# Patient Record
Sex: FEMALE | Race: OTHER | HISPANIC OR LATINO | URBAN - METROPOLITAN AREA
[De-identification: names, ages, dates, MRNs, and addresses within clinical notes are randomized per-mention and may not be internally consistent; named-entity substitution may affect disease eponyms.]

---

## 2023-03-14 PROBLEM — Z00.00 ENCOUNTER FOR PREVENTIVE HEALTH EXAMINATION: Status: ACTIVE | Noted: 2023-03-14

## 2023-03-20 ENCOUNTER — OUTPATIENT (OUTPATIENT)
Dept: OUTPATIENT SERVICES | Facility: HOSPITAL | Age: 23
LOS: 1 days | End: 2023-03-20
Payer: COMMERCIAL

## 2023-03-20 ENCOUNTER — APPOINTMENT (OUTPATIENT)
Dept: PEDIATRIC ADOLESCENT MEDICINE | Facility: CLINIC | Age: 23
End: 2023-03-20
Payer: COMMERCIAL

## 2023-03-20 ENCOUNTER — NON-APPOINTMENT (OUTPATIENT)
Age: 23
End: 2023-03-20

## 2023-03-20 VITALS
RESPIRATION RATE: 20 BRPM | HEIGHT: 58.5 IN | HEART RATE: 78 BPM | TEMPERATURE: 97 F | SYSTOLIC BLOOD PRESSURE: 108 MMHG | BODY MASS INDEX: 24.72 KG/M2 | WEIGHT: 120.99 LBS | DIASTOLIC BLOOD PRESSURE: 57 MMHG

## 2023-03-20 DIAGNOSIS — Z01.419 ENCOUNTER FOR GYNECOLOGICAL EXAMINATION (GENERAL) (ROUTINE) W/OUT ABNORMAL FINDINGS: ICD-10-CM

## 2023-03-20 DIAGNOSIS — Z00.00 ENCOUNTER FOR GENERAL ADULT MEDICAL EXAMINATION WITHOUT ABNORMAL FINDINGS: ICD-10-CM

## 2023-03-20 DIAGNOSIS — N89.8 OTHER SPECIFIED NONINFLAMMATORY DISORDERS OF VAGINA: ICD-10-CM

## 2023-03-20 PROCEDURE — 99215 OFFICE O/P EST HI 40 MIN: CPT | Mod: 25

## 2023-03-20 PROCEDURE — T1013: CPT

## 2023-03-20 PROCEDURE — 88142 CYTOPATH C/V THIN LAYER: CPT

## 2023-03-20 RX ORDER — METRONIDAZOLE 500 MG/1
500 TABLET ORAL TWICE DAILY
Refills: 0 | Status: COMPLETED | OUTPATIENT
Start: 2023-03-20

## 2023-03-20 RX ADMIN — METRONIDAZOLE 0 MG: 500 TABLET ORAL at 00:00

## 2023-03-21 ENCOUNTER — NON-APPOINTMENT (OUTPATIENT)
Age: 23
End: 2023-03-21

## 2023-03-21 NOTE — HISTORY OF PRESENT ILLNESS
[Up to date] : Up to date [LMP: _____] : LMP: [unfilled] [Days of Bleeding: _____] : Days of bleeding: [unfilled] [Has friends] : has friends [No] : No cigarette smoke exposure [Yes] : Patient has had sexual intercourse. [Has ways to cope with stress] : has ways to cope with stress [Displays self-confidence] : displays self-confidence [With Teen] : teen [Normal] : normal [Eats regular meals including adequate fruits and vegetables] : eats regular meals including adequate fruits and vegetables [Has peer relationships free of violence] : has peer relationships free of violence [Irregular menses] : no irregular menses [Heavy Bleeding] : no heavy bleeding [Hirsutism] : no hirsutism [Acne] : no acne [Has interests/participates in community activities/volunteers] : does not have interests/participates in community activities/volunteers [Uses electronic nicotine delivery system] : does not use electronic nicotine delivery system [Exposure to electronic nicotine delivery system] : no exposure to electronic nicotine delivery system [Uses tobacco] : does not use tobacco [Exposure to tobacco] : no exposure to tobacco [Uses drugs] : does not use drugs  [Exposure to drugs] : no exposure to drugs [Drinks alcohol] : does not drink alcohol [Exposure to alcohol] : no exposure to alcohol [Has problems with sleep] : does not have problems with sleep [Has thought about hurting self or considered suicide] : has not thought about hurting self or considered suicide [de-identified] : As per patient [de-identified] : Does not attend school  [de-identified] : Last encounter was 3 months ago, not currently sexually active [FreeTextEntry1] : 21 yo  female presenting to the clinic for gynecological exam and establishment of care. Presenting with 3 months of foul vaginal smell, and white discharge. Denies any dysuria, hematuria, abdominal pain, fever. pt has taken metronidazole for this condition in the past.\par Positive family hx of cervical and vulvar cancer in mother and grandmother respectively, for which she has been getting PAP smears every 6 months, as per doctor recommendations in [place], last PAP smear was about a year ago and was normal. Patient denies any cervical changes. \par Patient moved from [place] in 2023, [place] border, was initially living in [place], moved to live in [place] with her friend due to unsafe living condition. Patient feels safe with friend. 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 Patient has a 4 year old daughter who lives in [place] with her mother, plans on bringing her to [place] to live with her once she establishes a more stable living. No known allergies, denies medical history, surgical hx, does not take any medications regularly. \par

## 2023-03-21 NOTE — BEGINNING OF VISIT
[Patient] : patient [] :  [Other: ______] : provided by [employer] [Time Spent: ____ minutes] : Total time spent using  services: [unfilled] minutes. The patient's primary language is not English thus required  services. [Interpreters_IDNumber] : [id number]

## 2023-03-21 NOTE — PHYSICAL EXAM
[No Acute Distress] : no acute distress [Normocephalic] : normocephalic [Atraumatic] : atraumatic [EOMI Bilateral] : EOMI bilateral [Clear tympanic membranes with bony landmarks and light reflex present bilaterally] : clear tympanic membranes with bony landmarks and light reflex present bilaterally  [Pink Nasal Mucosa] : pink nasal mucosa [Nonerythematous Oropharynx] : nonerythematous oropharynx [Clear to Auscultation Bilaterally] : clear to auscultation bilaterally [Regular Rate and Rhythm] : regular rate and rhythm [Normal S1, S2 audible] : normal S1, S2 audible [No Murmurs] : no murmurs [Soft] : soft [NonTender] : non tender [Non Distended] : non distended [Normoactive Bowel Sounds] : normoactive bowel sounds [Josias: ____] : Josias [unfilled] [No Masses] : no masses [Josias: _____] : Josias [unfilled] [Normal Muscle Tone] : normal muscle tone [Straight] : straight [No Nipple Discharge] : no nipple discharge [Normal External Genitalia] : normal external genitalia [de-identified] : KHALIDA Narayan, PGY2 was the chaperone for the breast exam [FreeTextEntry6] : KHALIDA Narayan, PGY2 and FAROOQ Davila were the chaperones for GYN exam

## 2023-03-21 NOTE — RISK ASSESSMENT
[0] : 2) Feeling down, depressed, or hopeless: Not at all (0) [PHQ-2 Negative - No further assessment needed] : PHQ-2 Negative - No further assessment needed [[id number]] : 0

## 2023-03-21 NOTE — DISCUSSION/SUMMARY
[Met privately with the adolescent for part of the office visit?] : Met privately with the adolescent for part of the office visit? Yes [Adolescent demonstrates understanding of his/her conditions and how to take prescribed medications?] : Adolescent demonstrates understanding of his/her conditions and how to take prescribed medications? Yes [Adolescent asks questions during each office  visit and participates in the care plan?] : Adolescent asks questions during each office visit and participates in the care plan? Yes [Adolescent is competent in independently making appointments, filling prescriptions, following up on referrals, and seeking emergency services, as needed?] : Adolescent is competent in independently making appointments, filling prescriptions, following up on referrals, and seeking emergency services, as needed? Yes [FreeTextEntry1] : contraception reviewed, including LARC and abstinence. pt continues to be abstinent\par reviewed STI and HIV. The following key points were reviewed with the patient:\par \par ·	HIV is the virus that causes AIDS.  It can be spread through unprotected sex (vaginal, anal or oral sex) with someone who has HIV; contact with HIV-infected blood by sharing needles (piercing, tattooing, drug equipment, including needles); by HIV-infected pregnant women to their infants during pregnancy or delivery, or by breast-feeding.\par ·	There are treatments for HIV/AIDS that can help a person stay healthy.\par ·	People with HIV/AIDS can use safe practices to protect others from becoming infected.  Safe practices also protect people with HIV/AIDS from being infected with different strains of HIV.\par ·	Testing is voluntary and can be done at a public testing center without giving your name  (anonymous testing).\par ·	By law, HIV test results and other related information are kept confidential (private).\par ·	Discrimination based on a person’s HIV status is illegal. People who are discriminated against can get help.\par ·	Consent for HIV-related testing remains in effect until it is withdrawn verbally or in writing.  If the consent was given for a specific period of time, the consent applies to that time period only.  Persons may withdraw their consent at any time.\par \par [ X] Verbal discussion occurred with patient\par [ ] Written information was given to patient\par \par HIV testing was offered and the patient agreed to HIV testing.\par \par \par pap, vaginal panel and STI/HIV sent\par treatment with Metronidazole\par f/u 2 weeks

## 2023-03-21 NOTE — REVIEW OF SYSTEMS
[Vaginal Dischage] : vaginal discharge [Vaginal Itch] : vaginal itch [Fever] : no fever [Change in Weight] : no change in weight [Headache] : no headache [Dysuria] : no dysuria [Polyuria] : no polyuria [Hematuria] : no hematuria [Vaginal Pain] : no vaginal pain

## 2023-03-24 LAB — CYTOLOGY CVX/VAG DOC THIN PREP: ABNORMAL

## 2023-03-27 LAB
A VAGINAE DNA VAG QL NAA+PROBE: ABNORMAL
BVAB2 DNA VAG QL NAA+PROBE: NORMAL
C KRUSEI DNA VAG QL NAA+PROBE: NEGATIVE
C KRUSEI DNA VAG QL NAA+PROBE: POSITIVE
C TRACH RRNA SPEC QL NAA+PROBE: NEGATIVE
MEGA1 DNA VAG QL NAA+PROBE: NORMAL
N GONORRHOEA RRNA SPEC QL NAA+PROBE: NEGATIVE
T VAGINALIS RRNA SPEC QL NAA+PROBE: NEGATIVE

## 2023-03-28 DIAGNOSIS — Z01.419 ENCOUNTER FOR GYNECOLOGICAL EXAMINATION (GENERAL) (ROUTINE) WITHOUT ABNORMAL FINDINGS: ICD-10-CM

## 2023-03-28 DIAGNOSIS — N89.8 OTHER SPECIFIED NONINFLAMMATORY DISORDERS OF VAGINA: ICD-10-CM

## 2023-03-28 DIAGNOSIS — Z30.09 ENCOUNTER FOR OTHER GENERAL COUNSELING AND ADVICE ON CONTRACEPTION: ICD-10-CM

## 2023-04-01 ENCOUNTER — LABORATORY RESULT (OUTPATIENT)
Age: 23
End: 2023-04-01

## 2023-04-02 LAB
HIV1+2 AB SPEC QL IA.RAPID: NONREACTIVE
T PALLIDUM AB SER QL IA: POSITIVE

## 2023-04-03 ENCOUNTER — APPOINTMENT (OUTPATIENT)
Dept: PEDIATRIC ADOLESCENT MEDICINE | Facility: CLINIC | Age: 23
End: 2023-04-03
Payer: COMMERCIAL

## 2023-04-03 ENCOUNTER — OUTPATIENT (OUTPATIENT)
Dept: OUTPATIENT SERVICES | Facility: HOSPITAL | Age: 23
LOS: 1 days | End: 2023-04-03
Payer: COMMERCIAL

## 2023-04-03 ENCOUNTER — NON-APPOINTMENT (OUTPATIENT)
Age: 23
End: 2023-04-03

## 2023-04-03 VITALS
SYSTOLIC BLOOD PRESSURE: 113 MMHG | RESPIRATION RATE: 20 BRPM | DIASTOLIC BLOOD PRESSURE: 57 MMHG | TEMPERATURE: 97.5 F | HEART RATE: 77 BPM | HEIGHT: 58.5 IN | WEIGHT: 123 LBS | BODY MASS INDEX: 25.13 KG/M2

## 2023-04-03 DIAGNOSIS — Z23 ENCOUNTER FOR IMMUNIZATION: ICD-10-CM

## 2023-04-03 DIAGNOSIS — Z00.00 ENCOUNTER FOR GENERAL ADULT MEDICAL EXAMINATION WITHOUT ABNORMAL FINDINGS: ICD-10-CM

## 2023-04-03 PROCEDURE — 99215 OFFICE O/P EST HI 40 MIN: CPT | Mod: 25

## 2023-04-03 PROCEDURE — 90471 IMMUNIZATION ADMIN: CPT

## 2023-04-03 PROCEDURE — 90651 9VHPV VACCINE 2/3 DOSE IM: CPT

## 2023-04-03 RX ORDER — PENICILLIN G BENZATHINE 2400000 [IU]/4ML
2400000 INJECTION, SUSPENSION INTRAMUSCULAR
Qty: 0 | Refills: 0 | Status: COMPLETED | OUTPATIENT
Start: 2023-04-03

## 2023-04-03 RX ADMIN — PENICILLIN G BENZATHINE 0 UNIT/4ML: 2400000 INJECTION, SUSPENSION INTRAMUSCULAR at 00:00

## 2023-04-05 NOTE — BEGINNING OF VISIT
[Patient] : patient [] :  [Other: ______] : provided by [employer] [Time Spent: ____ minutes] : Total time spent using  services: [unfilled] minutes. The patient's primary language is not English thus required  services. [Interpreters_IDNumber] : [id number] [Interpreters_FullName] : Ayan [TWNoteComboBox1] : [place]

## 2023-04-05 NOTE — DISCUSSION/SUMMARY
[FreeTextEntry1] : 23 yo F presented to the clinic for follow up results. Patient had negative HIV, gonorrhea and pap test. She was positive for Syphilis, never treated but asymptomatic. \par \par Plan:\par 1. Syphilis -Latent\par \par - Anticipatory guidance given\par - Penicillin G given in 1 dose at visit - tolerated well. no complications\par - Results to be reported to health department\par - pt to inform partner in [place] of diagnosis and treatment\par - HPV 2nd vaccine given at visit\par - Follow up in 4 mths for Repeat. syphilis test and 3rd HPV vaccine\par - pt states her partner will be in [place] in 3 months. interested in birth control when he arrives.  recommended to return to health center for contraceptive counseling in 1-2 month to review choices and start.

## 2023-04-05 NOTE — HISTORY OF PRESENT ILLNESS
[de-identified] : contraception and STI [FreeTextEntry6] : 23 yo F with Hx of HPV infection presented to the clinic for results follow up. Patient had negative HIV, GC and pap test. However, her Syphilis was positive, patient mentioned that when she was pregnant 4 yrs ago she had a positive test and then Repeat test was negative, so she was never treated. She had a repeat RPR in Oct 2022 that was negative. 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 Patient denied Sxs.\par \par PMH: HPV + 5yrs ago, got treatment every 6 mths until negative\par Med: none\par Allergy: NKDA\par Gyn: regular periods, last 5 days about 4 pads/day\par FH: Lives with a friend\par received 1 dose of HPV at 11 yr old\par \par

## 2023-04-05 NOTE — REVIEW OF SYSTEMS
[Fever] : no fever [Chills] : no chills [Headache] : no headache [Eye Discharge] : no eye discharge [Eye Redness] : no eye redness [Chest Pain] : no chest pain [Cough] : no cough [Vomiting] : no vomiting [Diarrhea] : no diarrhea [Abdominal Pain] : no abdominal pain [Weakness] : no weakness [Dizziness] : no dizziness [Dysuria] : no dysuria [Vaginal Dischage] : no vaginal discharge [Vaginal Itch] : no vaginal itch [Irregular Menstrual Cycle] : no irregular menstrual cycle

## 2023-04-05 NOTE — PHYSICAL EXAM
[Alert] : alert [Clear] : right tympanic membrane clear [Symmetric Chest Wall] : symmetric chest wall [Clear to Auscultation Bilaterally] : clear to auscultation bilaterally [Regular Rate and Rhythm] : regular rate and rhythm [Normal S1, S2 audible] : normal S1, S2 audible [Soft] : soft [Normal Bowel Sounds] : normal bowel sounds [Acute Distress] : no acute distress [Tired appearing] : not tired appearing [Erythematous Oropharynx] : nonerythematous oropharynx [Supple] : not supple [Tenderness With Palpation of Chest Wall] : no tenderness with palpation of chest wall [Tender] : nontender [Distended] : nondistended [NL] : warm, clear

## 2023-04-06 DIAGNOSIS — A53.0 LATENT SYPHILIS, UNSPECIFIED AS EARLY OR LATE: ICD-10-CM

## 2023-04-06 DIAGNOSIS — Z23 ENCOUNTER FOR IMMUNIZATION: ICD-10-CM

## 2023-04-06 DIAGNOSIS — Z30.09 ENCOUNTER FOR OTHER GENERAL COUNSELING AND ADVICE ON CONTRACEPTION: ICD-10-CM

## 2023-04-10 ENCOUNTER — OUTPATIENT (OUTPATIENT)
Dept: OUTPATIENT SERVICES | Facility: HOSPITAL | Age: 23
LOS: 1 days | End: 2023-04-10
Payer: COMMERCIAL

## 2023-04-10 ENCOUNTER — APPOINTMENT (OUTPATIENT)
Dept: PEDIATRIC ADOLESCENT MEDICINE | Facility: CLINIC | Age: 23
End: 2023-04-10
Payer: COMMERCIAL

## 2023-04-10 VITALS
HEIGHT: 58 IN | WEIGHT: 120 LBS | BODY MASS INDEX: 25.19 KG/M2 | TEMPERATURE: 96.3 F | SYSTOLIC BLOOD PRESSURE: 110 MMHG | RESPIRATION RATE: 16 BRPM | HEART RATE: 75 BPM | DIASTOLIC BLOOD PRESSURE: 59 MMHG

## 2023-04-10 DIAGNOSIS — Z00.00 ENCOUNTER FOR GENERAL ADULT MEDICAL EXAMINATION WITHOUT ABNORMAL FINDINGS: ICD-10-CM

## 2023-04-10 DIAGNOSIS — B37.31 ACUTE CANDIDIASIS OF VULVA AND VAGINA: ICD-10-CM

## 2023-04-10 PROCEDURE — 99214 OFFICE O/P EST MOD 30 MIN: CPT | Mod: 25

## 2023-04-10 PROCEDURE — T1013: CPT

## 2023-04-10 RX ADMIN — Medication 0 UNIT/4ML: at 00:00

## 2023-04-11 NOTE — HISTORY OF PRESENT ILLNESS
[de-identified] : syphilis treatment [FreeTextEntry6] : pt is a 22 year old  female with latent syphilis with positive confirmed test result on 4/1/23. pt received 1 penicillin treatment 1 week ago, on 4/3/23. pt reported that she had a positive test 4 years ago and was not treated at the time but subsequent negative tests. last negative test was October 2022 (6 months ago)\par [facility] contacted pt to recommend a total of 3 treatments.  pt scheduled appointment for second treatment today.  states that she had no complications from previous treatment.  NKDA\par pt states that her ex-partner, who lives in [place], is getting treatment now.  she states that he is no longer her partner.  \par

## 2023-04-11 NOTE — BEGINNING OF VISIT
[Patient] : patient [] :  [Other: ______] : provided by [employer] [Time Spent: ____ minutes] : Total time spent using  services: [unfilled] minutes. The patient's primary language is not English thus required  services. [Interpreters_IDNumber] : [id number] [Interpreters_FullName] : Lorenza

## 2023-04-11 NOTE — RISK ASSESSMENT
[Has family members/adults to turn to for help] : has family members/adults to turn to for help [Has concerns about body or appearance] : does not have concerns about body or appearance [Has friends] : has friends [Uses tobacco] : does not use tobacco [Home is free of violence] : home is free of violence [Has peer relationships free of violence] : has peer relationships free of violence [Has had sexual intercourse] : has had sexual intercourse [Displays self-confidence] : displays self-confidence [de-identified] : employed

## 2023-04-11 NOTE — DISCUSSION/SUMMARY
[FreeTextEntry1] : pt asked me to contact Aleida Rodgers of [facility] [phone]. recommended 3 doses of peniciliin. left message.  Aleida Rodgers contacted our office nurse who confirmed second dose of penicillin was given today as treatment.  STI report with first dose of penicillin treatment was submitted to [facility] reportable cases.\par reviewed PAP results which showed negative for malignant changes but positive for Candida. reviewed OTC treatment options for patient. pt verbalized understanding of the plan.\par pt to return in 1 week for 3rd penicillin treatment\par condom use reviewed.

## 2023-04-12 DIAGNOSIS — A53.0 LATENT SYPHILIS, UNSPECIFIED AS EARLY OR LATE: ICD-10-CM

## 2023-04-12 DIAGNOSIS — B37.31 ACUTE CANDIDIASIS OF VULVA AND VAGINA: ICD-10-CM

## 2023-04-12 DIAGNOSIS — Z30.09 ENCOUNTER FOR OTHER GENERAL COUNSELING AND ADVICE ON CONTRACEPTION: ICD-10-CM

## 2023-04-17 ENCOUNTER — APPOINTMENT (OUTPATIENT)
Dept: PEDIATRIC ADOLESCENT MEDICINE | Facility: CLINIC | Age: 23
End: 2023-04-17

## 2023-04-17 ENCOUNTER — OUTPATIENT (OUTPATIENT)
Dept: OUTPATIENT SERVICES | Facility: HOSPITAL | Age: 23
LOS: 1 days | End: 2023-04-17
Payer: COMMERCIAL

## 2023-04-17 ENCOUNTER — APPOINTMENT (OUTPATIENT)
Dept: PEDIATRIC ADOLESCENT MEDICINE | Facility: CLINIC | Age: 23
End: 2023-04-17
Payer: COMMERCIAL

## 2023-04-17 VITALS
SYSTOLIC BLOOD PRESSURE: 127 MMHG | RESPIRATION RATE: 20 BRPM | TEMPERATURE: 96.5 F | HEART RATE: 83 BPM | DIASTOLIC BLOOD PRESSURE: 56 MMHG | HEIGHT: 58 IN | BODY MASS INDEX: 25.19 KG/M2 | WEIGHT: 120 LBS

## 2023-04-17 DIAGNOSIS — A53.0 LATENT SYPHILIS, UNSPECIFIED AS EARLY OR LATE: ICD-10-CM

## 2023-04-17 DIAGNOSIS — Z00.00 ENCOUNTER FOR GENERAL ADULT MEDICAL EXAMINATION WITHOUT ABNORMAL FINDINGS: ICD-10-CM

## 2023-04-17 DIAGNOSIS — B35.1 TINEA UNGUIUM: ICD-10-CM

## 2023-04-17 PROCEDURE — 99214 OFFICE O/P EST MOD 30 MIN: CPT | Mod: 25

## 2023-04-17 PROCEDURE — T1013: CPT

## 2023-04-17 PROCEDURE — 99214 OFFICE O/P EST MOD 30 MIN: CPT

## 2023-04-17 RX ORDER — TERBINAFINE HYDROCHLORIDE 250 MG/1
250 TABLET ORAL DAILY
Qty: 84 | Refills: 0 | Status: ACTIVE | COMMUNITY
Start: 2023-04-17 | End: 1900-01-01

## 2023-04-17 RX ADMIN — PENICILLIN G BENZATHINE 0 UNIT/4ML: 2400000 INJECTION, SUSPENSION INTRAMUSCULAR at 00:00

## 2023-04-18 NOTE — DISCUSSION/SUMMARY
[FreeTextEntry1] : received 3rd injection of penicillin. no complications, tolerated well\par reviewed consistent condom use.  reviewed contraception, including LARC and abstinence.  pt interested in starting DepoProvera.  will review at next visit and initiate contraception\par will prescribe treatment for toenail fungal infection.  pt awaiting health insurance prescription card in the mail\par f/u in 1 week to review contraception\par f/u 3 months to repeat syphilis testing\par

## 2023-04-18 NOTE — PHYSICAL EXAM
[NL] : regular rate and rhythm, normal S1, S2 audible, no murmurs [de-identified] : 3 thickened toenails on L foot  - exam limited by nail polish

## 2023-04-18 NOTE — RISK ASSESSMENT
[Has family members/adults to turn to for help] : has family members/adults to turn to for help [Uses tobacco] : does not use tobacco [Home is free of violence] : home is free of violence [Has peer relationships free of violence] : has peer relationships free of violence [Has had sexual intercourse] : has had sexual intercourse [Displays self-confidence] : displays self-confidence

## 2023-04-18 NOTE — HISTORY OF PRESENT ILLNESS
[de-identified] : Latent syphilis  [FreeTextEntry6] : Pt is a 22 year old  female with latent syphilis with positive confirmed test result on 4/1/23. pt received 2 penicillin treatments, on 4/3/23 and 4/10/23. Pt reported that she had a positive test 4 years ago and was not treated at the time but subsequent negative tests. last negative test was October 2022 (6 months ago)\par [facility] contacted pt to recommend a total of 3 treatments. pt scheduled appointment for third treatment today. states that she had no complications from previous treatment. NKDA\par pt states that her ex-partner, who lives in [place], is getting treatment now. she states that he is no longer her partner. \par \par At last appointment patient reported vaginal pruritus, for which she used OTC Monistat cream for 7 days, after which her symptoms resolved.\par \par Patient also reports chronic fungal infection of toenails on L foot. She was taking an oral antifungal medication in [place], which she completed 1 month of 6 month course. Medication was discontinued in December 2023 when [place] arrived in the [place] She has no pain, pruritus, wound, or discharge from the toes.

## 2023-04-18 NOTE — BEGINNING OF VISIT
[Patient] : patient [] :  [Other: ______] : provided by [employer] [Time Spent: ____ minutes] : Total time spent using  services: [unfilled] minutes. The patient's primary language is not English thus required  services. [Interpreters_IDNumber] : [id number] [Interpreters_FullName] : Mariajose

## 2023-04-20 DIAGNOSIS — A53.0 LATENT SYPHILIS, UNSPECIFIED AS EARLY OR LATE: ICD-10-CM

## 2023-04-20 DIAGNOSIS — Z30.09 ENCOUNTER FOR OTHER GENERAL COUNSELING AND ADVICE ON CONTRACEPTION: ICD-10-CM

## 2023-04-20 DIAGNOSIS — B35.1 TINEA UNGUIUM: ICD-10-CM

## 2023-04-24 ENCOUNTER — APPOINTMENT (OUTPATIENT)
Dept: PEDIATRIC ADOLESCENT MEDICINE | Facility: CLINIC | Age: 23
End: 2023-04-24

## 2023-05-09 ENCOUNTER — APPOINTMENT (OUTPATIENT)
Dept: PEDIATRIC ADOLESCENT MEDICINE | Facility: CLINIC | Age: 23
End: 2023-05-09

## 2023-05-12 ENCOUNTER — APPOINTMENT (OUTPATIENT)
Dept: PEDIATRIC ADOLESCENT MEDICINE | Facility: CLINIC | Age: 23
End: 2023-05-12

## 2023-05-23 ENCOUNTER — OUTPATIENT (OUTPATIENT)
Dept: OUTPATIENT SERVICES | Facility: HOSPITAL | Age: 23
LOS: 1 days | End: 2023-05-23
Payer: COMMERCIAL

## 2023-05-23 ENCOUNTER — NON-APPOINTMENT (OUTPATIENT)
Age: 23
End: 2023-05-23

## 2023-05-23 ENCOUNTER — RESULT CHARGE (OUTPATIENT)
Age: 23
End: 2023-05-23

## 2023-05-23 ENCOUNTER — APPOINTMENT (OUTPATIENT)
Dept: PEDIATRIC ADOLESCENT MEDICINE | Facility: CLINIC | Age: 23
End: 2023-05-23
Payer: COMMERCIAL

## 2023-05-23 VITALS
TEMPERATURE: 97 F | WEIGHT: 124 LBS | HEART RATE: 76 BPM | BODY MASS INDEX: 26.03 KG/M2 | RESPIRATION RATE: 20 BRPM | DIASTOLIC BLOOD PRESSURE: 51 MMHG | HEIGHT: 58 IN | SYSTOLIC BLOOD PRESSURE: 114 MMHG

## 2023-05-23 DIAGNOSIS — Z00.00 ENCOUNTER FOR GENERAL ADULT MEDICAL EXAMINATION WITHOUT ABNORMAL FINDINGS: ICD-10-CM

## 2023-05-23 DIAGNOSIS — Z30.013 ENCOUNTER FOR INITIAL PRESCRIPTION OF INJECTABLE CONTRACEPTIVE: ICD-10-CM

## 2023-05-23 PROCEDURE — 81025 URINE PREGNANCY TEST: CPT

## 2023-05-23 PROCEDURE — 99215 OFFICE O/P EST HI 40 MIN: CPT | Mod: 25

## 2023-05-23 PROCEDURE — 96372 THER/PROPH/DIAG INJ SC/IM: CPT

## 2023-05-23 RX ORDER — MEDROXYPROGESTERONE ACETATE 150 MG/ML
150 INJECTION, SUSPENSION INTRAMUSCULAR
Refills: 0 | Status: COMPLETED | OUTPATIENT
Start: 2023-05-23

## 2023-05-23 RX ADMIN — MEDROXYPROGESTERONE ACETATE 0 MG/ML: 150 INJECTION, SUSPENSION, EXTENDED RELEASE INTRAMUSCULAR at 00:00

## 2023-05-23 NOTE — DISCUSSION/SUMMARY
[FreeTextEntry1] : 23 yo F  is here for follow up on syphilis and to be started on depo shot. Currently she is asymptomatic. VSS. PE unremarkable. \par - POC preganancy test \par - depo shot today \par - RTC in July for re-testing for syphilis \par - RTC for depo shot \par - RTC prn

## 2023-05-23 NOTE — HISTORY OF PRESENT ILLNESS
[FreeTextEntry6] : 23 yo F  is here for follow up on syphilis and to be started on depo shot. Patient had depo shot before. She had last shot 7-8 months ago. [place] moved [place] and had no access to depo shot. \par LMP 24, menarche 14 years old, every 30 days. \par Last sexual intercourse 3 months ago. \par Patient received 3 shot of penicillin, last given 3 weeks ago. \par Patient denied nausea, vomiting, diarrhea, constipation, dysuria, hematuria, vaginal discharge. \par \par U preg negative.  Depo given/started.

## 2023-05-24 LAB — HCG UR QL: NEGATIVE

## 2023-05-25 DIAGNOSIS — Z32.02 ENCOUNTER FOR PREGNANCY TEST, RESULT NEGATIVE: ICD-10-CM

## 2023-05-25 DIAGNOSIS — Z30.013 ENCOUNTER FOR INITIAL PRESCRIPTION OF INJECTABLE CONTRACEPTIVE: ICD-10-CM

## 2023-07-24 ENCOUNTER — LABORATORY RESULT (OUTPATIENT)
Age: 23
End: 2023-07-24

## 2023-07-25 ENCOUNTER — APPOINTMENT (OUTPATIENT)
Dept: PEDIATRIC ADOLESCENT MEDICINE | Facility: CLINIC | Age: 23
End: 2023-07-25
Payer: COMMERCIAL

## 2023-07-25 ENCOUNTER — OUTPATIENT (OUTPATIENT)
Dept: OUTPATIENT SERVICES | Facility: HOSPITAL | Age: 23
LOS: 1 days | End: 2023-07-25
Payer: COMMERCIAL

## 2023-07-25 VITALS
TEMPERATURE: 97.1 F | HEIGHT: 58 IN | SYSTOLIC BLOOD PRESSURE: 111 MMHG | BODY MASS INDEX: 25.61 KG/M2 | RESPIRATION RATE: 20 BRPM | WEIGHT: 122 LBS | HEART RATE: 72 BPM | DIASTOLIC BLOOD PRESSURE: 62 MMHG

## 2023-07-25 DIAGNOSIS — J02.9 ACUTE PHARYNGITIS, UNSPECIFIED: ICD-10-CM

## 2023-07-25 DIAGNOSIS — Z00.00 ENCOUNTER FOR GENERAL ADULT MEDICAL EXAMINATION WITHOUT ABNORMAL FINDINGS: ICD-10-CM

## 2023-07-25 PROCEDURE — T1013: CPT

## 2023-07-25 PROCEDURE — 36415 COLL VENOUS BLD VENIPUNCTURE: CPT

## 2023-07-25 PROCEDURE — 99215 OFFICE O/P EST HI 40 MIN: CPT | Mod: 25

## 2023-07-25 PROCEDURE — 86780 TREPONEMA PALLIDUM: CPT

## 2023-07-25 PROCEDURE — 86592 SYPHILIS TEST NON-TREP QUAL: CPT

## 2023-07-25 PROCEDURE — 87081 CULTURE SCREEN ONLY: CPT

## 2023-07-25 PROCEDURE — 86593 SYPHILIS TEST NON-TREP QUANT: CPT

## 2023-07-25 NOTE — DISCUSSION/SUMMARY
[FreeTextEntry1] : 22Y F PMH of syphilis presenting for syphilis titer follow up. Patient has also had a few days of sore throat and difficulty swallowing. Denies cough, fever. Physical exam is significant for nasal congestion, bilateral tonsillar hypertrophy and erythema, no exudates. POCT strep test was negative at clinic. \par \par - Follow up syphilis titer\par - Follow up throat culture\par - RTC 8/8/23 for depo shot appointment

## 2023-07-25 NOTE — PHYSICAL EXAM
[Erythematous Oropharynx] : erythematous oropharynx [Enlarged Tonsils] : enlarged tonsils [No Abnormal Lymph Nodes Palpated] : no abnormal lymph nodes palpated [NL] : warm, clear [Exudate] : no exudate

## 2023-08-01 DIAGNOSIS — Z71.9 COUNSELING, UNSPECIFIED: ICD-10-CM

## 2023-08-01 DIAGNOSIS — Z30.09 ENCOUNTER FOR OTHER GENERAL COUNSELING AND ADVICE ON CONTRACEPTION: ICD-10-CM

## 2023-08-01 DIAGNOSIS — A53.9 SYPHILIS, UNSPECIFIED: ICD-10-CM

## 2023-08-01 DIAGNOSIS — J02.9 ACUTE PHARYNGITIS, UNSPECIFIED: ICD-10-CM

## 2023-08-01 DIAGNOSIS — Z70.9 SEX COUNSELING, UNSPECIFIED: ICD-10-CM

## 2023-08-01 DIAGNOSIS — Z30.8 ENCOUNTER FOR OTHER CONTRACEPTIVE MANAGEMENT: ICD-10-CM

## 2023-08-02 ENCOUNTER — APPOINTMENT (OUTPATIENT)
Dept: PEDIATRIC ADOLESCENT MEDICINE | Facility: CLINIC | Age: 23
End: 2023-08-02

## 2023-08-04 ENCOUNTER — OUTPATIENT (OUTPATIENT)
Dept: OUTPATIENT SERVICES | Facility: HOSPITAL | Age: 23
LOS: 1 days | End: 2023-08-04
Payer: COMMERCIAL

## 2023-08-04 ENCOUNTER — APPOINTMENT (OUTPATIENT)
Dept: PEDIATRIC ADOLESCENT MEDICINE | Facility: CLINIC | Age: 23
End: 2023-08-04
Payer: COMMERCIAL

## 2023-08-04 VITALS
SYSTOLIC BLOOD PRESSURE: 105 MMHG | RESPIRATION RATE: 20 BRPM | WEIGHT: 122.2 LBS | DIASTOLIC BLOOD PRESSURE: 52 MMHG | OXYGEN SATURATION: 99 % | TEMPERATURE: 98 F | HEART RATE: 78 BPM | BODY MASS INDEX: 25.65 KG/M2 | HEIGHT: 58 IN

## 2023-08-04 DIAGNOSIS — Z00.00 ENCOUNTER FOR GENERAL ADULT MEDICAL EXAMINATION WITHOUT ABNORMAL FINDINGS: ICD-10-CM

## 2023-08-04 DIAGNOSIS — Z71.2 PERSON CONSULTING FOR EXPLANATION OF EXAMINATION OR TEST FINDINGS: ICD-10-CM

## 2023-08-04 PROCEDURE — 99213 OFFICE O/P EST LOW 20 MIN: CPT | Mod: 25

## 2023-08-04 PROCEDURE — T1013: CPT

## 2023-08-04 PROCEDURE — 99214 OFFICE O/P EST MOD 30 MIN: CPT

## 2023-08-04 NOTE — BEGINNING OF VISIT
[Patient] : patient [] :  [[employer] ] : provided by [employer]   [Time Spent: ____ minutes] : Total time spent using  services: [unfilled] minutes. The patient's primary language is not English thus required  services.

## 2023-08-04 NOTE — HISTORY OF PRESENT ILLNESS
[de-identified] : 22 y.o. female here with past history of syphilis.  Here for lab test follow-up.  syphilis titers are >1:1.  Pt counseled that she does not currently have syphilis and only risk for syphilis is re-exposure.  Pt understood counseling.  Using condoms when sexually active.  No question or concerns.

## 2023-08-04 NOTE — COUNSELING
[Use of Plain Language] : use of plain language [Adequate] : adequate [None] : none [FreeTextEntry1] : via

## 2023-08-08 ENCOUNTER — APPOINTMENT (OUTPATIENT)
Dept: PEDIATRIC ADOLESCENT MEDICINE | Facility: CLINIC | Age: 23
End: 2023-08-08

## 2023-08-09 DIAGNOSIS — Z71.9 COUNSELING, UNSPECIFIED: ICD-10-CM

## 2023-08-09 DIAGNOSIS — Z30.8 ENCOUNTER FOR OTHER CONTRACEPTIVE MANAGEMENT: ICD-10-CM

## 2023-08-09 DIAGNOSIS — Z70.9 SEX COUNSELING, UNSPECIFIED: ICD-10-CM

## 2023-08-09 DIAGNOSIS — Z71.2 PERSON CONSULTING FOR EXPLANATION OF EXAMINATION OR TEST FINDINGS: ICD-10-CM

## 2023-08-09 DIAGNOSIS — Z30.09 ENCOUNTER FOR OTHER GENERAL COUNSELING AND ADVICE ON CONTRACEPTION: ICD-10-CM

## 2023-08-09 DIAGNOSIS — A53.9 SYPHILIS, UNSPECIFIED: ICD-10-CM

## 2023-11-10 ENCOUNTER — EMERGENCY (EMERGENCY)
Facility: HOSPITAL | Age: 23
LOS: 0 days | Discharge: HOME | End: 2023-11-10
Attending: STUDENT IN AN ORGANIZED HEALTH CARE EDUCATION/TRAINING PROGRAM
Payer: MEDICAID

## 2023-11-10 VITALS
DIASTOLIC BLOOD PRESSURE: 80 MMHG | TEMPERATURE: 98 F | HEART RATE: 87 BPM | OXYGEN SATURATION: 99 % | WEIGHT: 132.06 LBS | RESPIRATION RATE: 17 BRPM | SYSTOLIC BLOOD PRESSURE: 116 MMHG

## 2023-11-10 DIAGNOSIS — R51.9 HEADACHE, UNSPECIFIED: ICD-10-CM

## 2023-11-10 DIAGNOSIS — K02.9 DENTAL CARIES, UNSPECIFIED: ICD-10-CM

## 2023-11-10 DIAGNOSIS — K08.89 OTHER SPECIFIED DISORDERS OF TEETH AND SUPPORTING STRUCTURES: ICD-10-CM

## 2023-11-10 DIAGNOSIS — K12.0 RECURRENT ORAL APHTHAE: ICD-10-CM

## 2023-11-10 DIAGNOSIS — R50.9 FEVER, UNSPECIFIED: ICD-10-CM

## 2023-11-10 PROCEDURE — 99282 EMERGENCY DEPT VISIT SF MDM: CPT

## 2023-11-10 PROCEDURE — 99283 EMERGENCY DEPT VISIT LOW MDM: CPT

## 2023-11-10 RX ORDER — DIPHENHYDRAMINE HYDROCHLORIDE AND LIDOCAINE HYDROCHLORIDE AND ALUMINUM HYDROXIDE AND MAGNESIUM HYDRO
30 KIT ONCE
Refills: 0 | Status: COMPLETED | OUTPATIENT
Start: 2023-11-10 | End: 2023-11-10

## 2023-11-10 RX ADMIN — DIPHENHYDRAMINE HYDROCHLORIDE AND LIDOCAINE HYDROCHLORIDE AND ALUMINUM HYDROXIDE AND MAGNESIUM HYDRO 30 MILLILITER(S): KIT at 13:58

## 2023-11-10 NOTE — ED PROVIDER NOTE - OBJECTIVE STATEMENT
23 yo female no pmhx presents complainig of L dental pain x 3 days. Patient w. L cheek pain worse w PO intake, has tried taking Ibuprofen w minimal improvement. Associated subjective fevers and chills. No SOB, Cough.

## 2023-11-10 NOTE — ED ADULT TRIAGE NOTE - CHIEF COMPLAINT QUOTE
Patient presents to ED c/o left upper dental pain beginning Tuesday. Has been taking ibuprofen with no effect.

## 2023-11-10 NOTE — ED PROVIDER NOTE - PHYSICAL EXAMINATION
CONST: Well appearing in NAD  EYES: PERRL, EOMI, Sclera and conjunctiva clear.   ENT: Poor dentition throughout - no palpable abscess. L cheek tenderness w/ apthous ulcer. Uvula midline

## 2023-11-10 NOTE — ED PROVIDER NOTE - NSFOLLOWUPINSTRUCTIONS_ED_ALL_ED_FT
Dolor dental  Dental Pain  Aplique [place] en el área afectada de la boca para controlar el dolor.    El dolor dental es a menudo un signo de que sucede algo en los dientes o las encías. También puede tener dolor después de un tratamiento dental. Si tiene dolor dental, es importante que se ponga en contacto con brown dentista, especialmente si se desconoce la causa del dolor. El dolor dental puede molestarle mucho o poco, y puede estar causado por muchas cosas, entre ellas:  C[place].  Infección.  La parte interna del diente se llena de pus (un absceso).  Lesiones.  Megan grieta en el diente.  Encías que se retraen y exponen la raíz de un diente.  Enfermedad de las encías.  Apretar o rechinar los dientes en forma anormal.  No cuidar brooklyn los dientes.  A veces, la causa del dolor no se conoce.    Es posible que tenga dolor todo el tiempo o solo cuando:  [place].  Está expuesto a temperaturas calientes o frías.  Come o rowdy alimentos o bebidas que contienen mucha azúcar, elizabet refrescos o dulces.  Siga estas instrucciones en brown casa:  Medicamentos    [place] los medicamentos de venta gilbert y los recetados solamente elizabet se lo haya indicado el dentista.  Si le recetaron un antibiótico, tómelo elizabet se lo haya indicado el dentista. No deje de tomarlo, aunque comience a sentirse mejor.  Comida y bebida    No consuma los alimentos o las bebidas que le causen dolor. Estos incluyen:  Alimentos o bebidas muy calientes o muy fríos.  Alimentos o bebidas dulces o con azúcar.  Control del dolor y la hinchazón      Si se lo indican, aplique hielo sobre la davis dolorida de la todd. Para hacer esto:  Ponga el hielo en megan bolsa plástica.  Coloque megan toalla entre la piel y la bolsa.  Aplique el hielo amanda 20 minutos, 2 o 3 veces por día.  Retire el hielo si la piel se le pone de color ma brillante. [place] es muy importante. Si no puede sentir dolor, calor o frío, tiene un mayor riesgo de que se dañe la davis.  Cepillarse los dientes    [place] viviane dientes dos veces al día con dentífrico con fluoruro.  Use un dentífrico para dientes sensibles elizabet se lo haya indicado el dentista.  Use un cepillo de cerdas suaves.  Instrucciones generales    Use hilo dental al menos megan vez por día.  No se ponga calor en la parte externa de la otdd.  Enjuáguese la boca frecuentemente con megan mezcla de agua con sal. Para preparar agua con sal, disuelva de ½ a 1 cucharadita (de 3 a 6 g) de sal en 1 taza (237 ml) de agua tibia.  Controle el dolor dental. Informe a brown dentista si se produce algún cambio.  Concurra a todas las visitas de seguimiento.  Comuníquese con un dentista si:  Tiene dolor dental y no sabe por qué.  Los medicamentos no le alivian el dolor.  Viviane síntomas empeoran.  Aparecen nuevos síntomas.  Solicite ayuda de inmediato si:  No puede abrir la boca.  Tiene problemas para respirar o tragar.  Tiene fiebre.  Tiene hinchazón en la todd, el iker o la mandíbula.  Estos síntomas pueden indicar megan emergencia. Solicite ayuda de inmediato. Comuníquese con el servicio de emergencias de brown localidad (911 en los [place]).  No espere a hortensia si los síntomas desaparecen.  No conduzca por viviane propios medios hasta el hospital.  Resumen  Las causas del dolor dental pueden ser muchas, entre ellas, megan caries, megan lesión o megan infección. En algunos casos, se desconoce la causa.  El dolor dental puede molestar mucho o muy poco. Puede sentir dolor todo el tiempo o solo cuando come o rowdy.  [place] los medicamentos de venta gilbert y los recetados solamente elizabet se lo haya indicado el dentista.  Controle el dolor dental a fin de detectar algún cambio. Informe al dentista si los síntomas empeoran.  Esta información no tiene elizabet fin reemplazar el consejo del médico. Asegúrese de hacerle al médico cualquier pregunta que tenga.

## 2023-11-10 NOTE — ED PROVIDER NOTE - NS ED ATTENDING STATEMENT MOD
This was a shared visit with the KLARISSA. I reviewed and verified the documentation and independently performed the documented:

## 2023-11-10 NOTE — ED PROVIDER NOTE - NSFOLLOWUPCLINICS_GEN_ALL_ED_FT
[facility]  Dental  [address] [place] [id number]  Phone: [phone]  Fax:

## 2023-11-10 NOTE — ED PROVIDER NOTE - PATIENT PORTAL LINK FT
You can access the FollowMyHealth Patient Portal offered by [facility] by registering at the following website: [email or web address]. By joining [facility]’s FollowMyHealth portal, you will also be able to view your health information using other applications (apps) compatible with our system.

## 2023-11-10 NOTE — ED PROVIDER NOTE - CLINICAL SUMMARY MEDICAL DECISION MAKING FREE TEXT BOX
22-year-old female, presenting for 4 days of pain inside her left cheek.  Noted to have aphthous ulcer and cavity.  No abscess noted.  No tenderness to tooth.  Medication given in effects recess.  Recommended to use over-the-counter Orajel for pain relief to help with oral intake.  Recommended follow-up with dental clinic upon discharge.  Patient verbalized understanding and is agreeable to plan.  Stable for discharge.

## 2023-11-10 NOTE — ED PROVIDER NOTE - ATTENDING APP SHARED VISIT CONTRIBUTION OF CARE
22-year-old female, no past medical history, active smoker, presenting for pain on the inside of her left cheek that started 4 days ago, not alleviated by ibuprofen, painful when she eats and drinks, no other symptoms.  Has not seen a dentist in a long time.  States she had a fever 4 days ago but none since.    Aphthous ulcer noted to left lower gum near tooth 19 and 20  Patient with braces  Cavity noted to tooth 19/20 nontender with no abscess or drainage

## 2023-12-29 ENCOUNTER — APPOINTMENT (OUTPATIENT)
Dept: PEDIATRIC ADOLESCENT MEDICINE | Facility: CLINIC | Age: 23
End: 2023-12-29
Payer: COMMERCIAL

## 2023-12-29 ENCOUNTER — OUTPATIENT (OUTPATIENT)
Dept: OUTPATIENT SERVICES | Facility: HOSPITAL | Age: 23
LOS: 1 days | End: 2023-12-29
Payer: COMMERCIAL

## 2023-12-29 ENCOUNTER — LABORATORY RESULT (OUTPATIENT)
Age: 23
End: 2023-12-29

## 2023-12-29 VITALS
DIASTOLIC BLOOD PRESSURE: 57 MMHG | HEIGHT: 58.5 IN | RESPIRATION RATE: 20 BRPM | SYSTOLIC BLOOD PRESSURE: 95 MMHG | HEART RATE: 80 BPM | TEMPERATURE: 97.7 F | BODY MASS INDEX: 25.74 KG/M2 | WEIGHT: 125.99 LBS

## 2023-12-29 DIAGNOSIS — Z00.00 ENCOUNTER FOR GENERAL ADULT MEDICAL EXAMINATION WITHOUT ABNORMAL FINDINGS: ICD-10-CM

## 2023-12-29 DIAGNOSIS — R10.9 UNSPECIFIED ABDOMINAL PAIN: ICD-10-CM

## 2023-12-29 DIAGNOSIS — A53.9 SYPHILIS, UNSPECIFIED: ICD-10-CM

## 2023-12-29 DIAGNOSIS — Z71.3 DIETARY COUNSELING AND SURVEILLANCE: ICD-10-CM

## 2023-12-29 PROCEDURE — 86592 SYPHILIS TEST NON-TREP QUAL: CPT

## 2023-12-29 PROCEDURE — 99214 OFFICE O/P EST MOD 30 MIN: CPT

## 2023-12-29 PROCEDURE — 86780 TREPONEMA PALLIDUM: CPT

## 2023-12-29 PROCEDURE — 86593 SYPHILIS TEST NON-TREP QUANT: CPT

## 2023-12-29 NOTE — RISK ASSESSMENT
[Eats meals with family] : eats meals with family [Has family members/adults to turn to for help] : has family members/adults to turn to for help [Is permitted and is able to make independent decisions] : Is permitted and is able to make independent decisions [Grade: ____] : Grade: [unfilled] [Eats regular meals including adequate fruits and vegetables] : eats regular meals including adequate fruits and vegetables [Drinks non-sweetened liquids] : drinks non-sweetened liquids  [Calcium source] : calcium source [Has friends] : has friends [Screen time (except homework) less than 2 hours a day] : screen time (except homework) less than 2 hours a day [Drinks alcohol] : drinks alcohol [Home is free of violence] : home is free of violence [Uses safety belts/safety equipment] : uses safety belts/safety equipment  [Has peer relationships free of violence] : has peer relationships free of violence [Has/had oral sex] : has/had oral sex [Vaginal] : vaginal [Has ways to cope with stress] : has ways to cope with stress [Displays self-confidence] : displays self-confidence [With Teen] : teen [Has concerns about body or appearance] : does not have concerns about body or appearance [At least 1 hour of physical activity a day] : does not do at least 1 hour of physical activity a day [Has interests/participates in community activities/volunteers] : does not have interests/participates in community activities/volunteers [Uses tobacco] : does not use tobacco [Uses drugs] : does not use drugs  [Impaired/distracted driving] : no impaired/distracted driving [Has problems with sleep] : does not have problems with sleep [Gets depressed, anxious, or irritable/has mood swings] : does not get depressed, anxious, or irritable/has mood swings [Has thought about hurting self or considered suicide] : has not thought about hurting self or considered suicide [de-identified] : drinks alcohol socially

## 2023-12-29 NOTE — BEGINNING OF VISIT
[Other: ____] : [unfilled] [Patient] : patient [] :  [Other: ______] : provided by [employer] [Time Spent: ____ minutes] : Total time spent using  services: [unfilled] minutes. The patient's primary language is not English thus required  services. [Interpreters_IDNumber] : [id number] [Interpreters_FullName] : Manish [TWNoteComboBox1] : Swazi

## 2023-12-29 NOTE — PHYSICAL EXAM
[Straight] : straight [Tenderness] : no tenderness [Traumatic] : atraumatic [Pink Nasal Mucosa] : pink nasal mucosa [Soft] : soft [Tender] : nontender [Distended] : nondistended [Hepatosplenomegaly] : no hepatosplenomegaly [NL] : warm, clear

## 2023-12-29 NOTE — RISK ASSESSMENT
[Eats meals with family] : eats meals with family [Has family members/adults to turn to for help] : has family members/adults to turn to for help [Is permitted and is able to make independent decisions] : Is permitted and is able to make independent decisions [Grade: ____] : Grade: [unfilled] [Eats regular meals including adequate fruits and vegetables] : eats regular meals including adequate fruits and vegetables [Drinks non-sweetened liquids] : drinks non-sweetened liquids  [Calcium source] : calcium source [Has friends] : has friends [Screen time (except homework) less than 2 hours a day] : screen time (except homework) less than 2 hours a day [Drinks alcohol] : drinks alcohol [Home is free of violence] : home is free of violence [Uses safety belts/safety equipment] : uses safety belts/safety equipment  [Has peer relationships free of violence] : has peer relationships free of violence [Has/had oral sex] : has/had oral sex [Vaginal] : vaginal [Has ways to cope with stress] : has ways to cope with stress [Displays self-confidence] : displays self-confidence [With Teen] : teen [Has concerns about body or appearance] : does not have concerns about body or appearance [At least 1 hour of physical activity a day] : does not do at least 1 hour of physical activity a day [Has interests/participates in community activities/volunteers] : does not have interests/participates in community activities/volunteers [Uses tobacco] : does not use tobacco [Uses drugs] : does not use drugs  [Impaired/distracted driving] : no impaired/distracted driving [Has problems with sleep] : does not have problems with sleep [Gets depressed, anxious, or irritable/has mood swings] : does not get depressed, anxious, or irritable/has mood swings [Has thought about hurting self or considered suicide] : has not thought about hurting self or considered suicide [de-identified] : drinks alcohol socially

## 2023-12-29 NOTE — HISTORY OF PRESENT ILLNESS
[de-identified] : Pt is following up with syphilis status and also has concerns about contraception, chest pain, stomach pain [FreeTextEntry6] : Pt is presenting today with follow up of syphilis testing and contraception.  Previously on Depo-Provera but has not currently.  She is a consistent condom user.  Would like to start Nexplanon. LMP 12/28/23 She is also presenting with intermittent chest and abdominal pain. The chest pain started about a year ago, that randomly comes and goes and last for few seconds each time. The patient stated the chest pain occurs after eating greasy food. pt has not been taking any medications for the chest pain. pt denies chest palpitation and edema.   Pt stated her abdominal pain started a week ago along with URI and diarrhea. She rates the pain a 6 out of 10 and only occurs after meals.  the stool is yellow in color and watery which is different from her usual stool color of brown. pt denies constipation, nausea, blood in stool.

## 2023-12-29 NOTE — BEGINNING OF VISIT
[Other: ____] : [unfilled] [Patient] : patient [] :  [Other: ______] : provided by [employer] [Time Spent: ____ minutes] : Total time spent using  services: [unfilled] minutes. The patient's primary language is not English thus required  services. [Interpreters_IDNumber] : [id number] [Interpreters_FullName] : Manish [TWNoteComboBox1] : [place]

## 2023-12-29 NOTE — REVIEW OF SYSTEMS
[Chest Pain] : chest pain [Abdominal Pain] : abdominal pain [Negative] : Genitourinary [Fever] : no fever [Chills] : no chills [Malaise] : no malaise [Headache] : no headache [Eye Discharge] : no eye discharge [Eye Redness] : no eye redness

## 2023-12-29 NOTE — DISCUSSION/SUMMARY
[FreeTextEntry1] : #Syphylis: Pt had her 3 doses of penicillin, last dose was 6/2023, following up with blood work today  -syphilis screen ordered -fu in a week for the result  #Birth control -Contraceptive counseling, including LARC and abstinence. Consistent condom user -Pt wants to change to Nexplanon, which is scheduled for next Wednesday 1/3/24   # Abdominal and Chest pain -Pt symptoms reflect GERD, positive for chest pain after greasy meals.  advised to reduce acidic foods and try to eat an hour or two before going to bed. Pt was also advised to reduced fried food in her diet and to increase vegetables and fruits in diet -Pt was also advised to exercise at least 1 hr. a week, 3 hrs. a week in ideal.  -FU in a week to check if symptoms reduced

## 2023-12-29 NOTE — HISTORY OF PRESENT ILLNESS
[de-identified] : Pt is following up with syphilis status and also has concerns about contraception, chest pain, stomach pain [FreeTextEntry6] : Pt is presenting today with follow up of syphilis testing and contraception.  Previously on Depo-Provera but has not currently.  She is a consistent condom user.  Would like to start Nexplanon. LMP 12/28/23 She is also presenting with intermittent chest and abdominal pain. The chest pain started about a year ago, that randomly comes and goes and last for few seconds each time. The patient stated the chest pain occurs after eating greasy food. pt has not been taking any medications for the chest pain. pt denies chest palpitation and edema.   Pt stated her abdominal pain started a week ago along with URI and diarrhea. She rates the pain a 6 out of 10 and only occurs after meals.  the stool is yellow in color and watery which is different from her usual stool color of brown. pt denies constipation, nausea, blood in stool.

## 2024-01-02 DIAGNOSIS — Z30.09 ENCOUNTER FOR OTHER GENERAL COUNSELING AND ADVICE ON CONTRACEPTION: ICD-10-CM

## 2024-01-02 DIAGNOSIS — R10.9 UNSPECIFIED ABDOMINAL PAIN: ICD-10-CM

## 2024-01-02 DIAGNOSIS — Z71.3 DIETARY COUNSELING AND SURVEILLANCE: ICD-10-CM

## 2024-01-02 DIAGNOSIS — A53.9 SYPHILIS, UNSPECIFIED: ICD-10-CM

## 2024-01-03 ENCOUNTER — APPOINTMENT (OUTPATIENT)
Dept: PEDIATRIC ADOLESCENT MEDICINE | Facility: CLINIC | Age: 24
End: 2024-01-03

## 2024-01-03 LAB — T PALLIDUM AB SER QL IA: POSITIVE

## 2024-01-08 ENCOUNTER — OUTPATIENT (OUTPATIENT)
Dept: OUTPATIENT SERVICES | Facility: HOSPITAL | Age: 24
LOS: 1 days | End: 2024-01-08
Payer: COMMERCIAL

## 2024-01-08 ENCOUNTER — RESULT CHARGE (OUTPATIENT)
Age: 24
End: 2024-01-08

## 2024-01-08 ENCOUNTER — APPOINTMENT (OUTPATIENT)
Dept: PEDIATRIC ADOLESCENT MEDICINE | Facility: CLINIC | Age: 24
End: 2024-01-08
Payer: COMMERCIAL

## 2024-01-08 VITALS
BODY MASS INDEX: 25.94 KG/M2 | TEMPERATURE: 97.6 F | DIASTOLIC BLOOD PRESSURE: 56 MMHG | RESPIRATION RATE: 20 BRPM | SYSTOLIC BLOOD PRESSURE: 108 MMHG | HEIGHT: 58.5 IN | HEART RATE: 100 BPM | WEIGHT: 126.99 LBS

## 2024-01-08 DIAGNOSIS — Z30.8 ENCOUNTER FOR OTHER CONTRACEPTIVE MANAGEMENT: ICD-10-CM

## 2024-01-08 DIAGNOSIS — Z30.09 ENCOUNTER FOR OTHER GENERAL COUNSELING AND ADVICE ON CONTRACEPTION: ICD-10-CM

## 2024-01-08 DIAGNOSIS — Z70.9 SEX COUNSELING, UNSPECIFIED: ICD-10-CM

## 2024-01-08 DIAGNOSIS — Z32.02 ENCOUNTER FOR PREGNANCY TEST, RESULT NEGATIVE: ICD-10-CM

## 2024-01-08 DIAGNOSIS — Z00.00 ENCOUNTER FOR GENERAL ADULT MEDICAL EXAMINATION WITHOUT ABNORMAL FINDINGS: ICD-10-CM

## 2024-01-08 DIAGNOSIS — Z71.9 COUNSELING, UNSPECIFIED: ICD-10-CM

## 2024-01-08 DIAGNOSIS — Z30.017 ENCOUNTER FOR INITIAL PRESCRIPTION OF IMPLANTABLE SUBDERMAL CONTRACEPTIVE: ICD-10-CM

## 2024-01-08 LAB — HCG UR QL: NEGATIVE

## 2024-01-08 PROCEDURE — 99215 OFFICE O/P EST HI 40 MIN: CPT | Mod: 25

## 2024-01-08 PROCEDURE — 81025 URINE PREGNANCY TEST: CPT

## 2024-01-08 PROCEDURE — T1013: CPT

## 2024-01-08 PROCEDURE — 11981 INSERTION DRUG DLVR IMPLANT: CPT

## 2024-01-08 RX ORDER — ETONOGESTREL 68 MG/1
68 IMPLANT SUBCUTANEOUS
Qty: 0 | Refills: 0 | Status: COMPLETED | OUTPATIENT
Start: 2024-01-08

## 2024-01-08 RX ADMIN — ETONOGESTREL 0 MG: 68 IMPLANT SUBCUTANEOUS at 00:00

## 2024-01-08 NOTE — HISTORY OF PRESENT ILLNESS
[de-identified] : 23 y.o. female here for Nexplanon placement.   [FreeTextEntry6] : I have reviewed the following with the patient: 	The patient is not pregnant; 	The patient has no history of blood clots, such as blood clots in her legs (deep venous thrombosis), lungs (pulmonary embolism), eyes (total or partial blindness), heart (heart attack), or brain (stroke) that is not being treated; 	The patient has no liver disease or a liver tumor; 	The patient does not have unexplained vaginal bleeding that has not been evaluated by a clinician;  	The patient has no history of breast cancer or any other cancer that is sensitive to progestin (a female hormone), now or in the past;  	The patient has No Known Allergies that would be contraindicated for Nexplanon placement; 	I have reviewed all medications with the patient.   The patient's Left upper arm was palpated and marked.  The area was prepped with betadine and a sterile drape was placed. 3 ml of 1% lidoacaine was injected.      Nexplanon placement: the device was inserted without difficulty and palpated post insertion.  Steristrip and pressure dressing applied. Aftercare was discussed with the patient.          Nexplanon Lot #: [id number]                      Expiration Date:  05/19/2025  Orders: For documenting/ordering Nexplanon go to Orders -->Med Administration --> Type Nexplanon  Coding: CPT Code:   Insertion Code: [id number]  E/M Codes:  Z30.09 Z30.017 Z70.9 Z71.9

## 2024-01-08 NOTE — BEGINNING OF VISIT
[Patient] : patient [] :  [[employer] ] : provided by [employer]   [Time Spent: ____ minutes] : Total time spent using  services: [unfilled] minutes. The patient's primary language is not English thus required  services. [Interpreters_IDNumber] : Piero Gardner [Interpreters_FullName] : [id number]

## 2024-01-10 ENCOUNTER — APPOINTMENT (OUTPATIENT)
Dept: PEDIATRIC ADOLESCENT MEDICINE | Facility: CLINIC | Age: 24
End: 2024-01-10

## 2024-01-12 DIAGNOSIS — Z30.8 ENCOUNTER FOR OTHER CONTRACEPTIVE MANAGEMENT: ICD-10-CM

## 2024-01-12 DIAGNOSIS — Z71.9 COUNSELING, UNSPECIFIED: ICD-10-CM

## 2024-01-12 DIAGNOSIS — Z32.02 ENCOUNTER FOR PREGNANCY TEST, RESULT NEGATIVE: ICD-10-CM

## 2024-01-12 DIAGNOSIS — Z30.017 ENCOUNTER FOR INITIAL PRESCRIPTION OF IMPLANTABLE SUBDERMAL CONTRACEPTIVE: ICD-10-CM

## 2024-01-12 DIAGNOSIS — Z70.9 SEX COUNSELING, UNSPECIFIED: ICD-10-CM

## 2024-01-12 DIAGNOSIS — Z30.09 ENCOUNTER FOR OTHER GENERAL COUNSELING AND ADVICE ON CONTRACEPTION: ICD-10-CM

## 2024-08-30 ENCOUNTER — OUTPATIENT (OUTPATIENT)
Dept: OUTPATIENT SERVICES | Facility: HOSPITAL | Age: 24
LOS: 1 days | End: 2024-08-30
Payer: COMMERCIAL

## 2024-08-30 ENCOUNTER — APPOINTMENT (OUTPATIENT)
Dept: PEDIATRIC ADOLESCENT MEDICINE | Facility: CLINIC | Age: 24
End: 2024-08-30

## 2024-08-30 VITALS
BODY MASS INDEX: 25.74 KG/M2 | RESPIRATION RATE: 20 BRPM | TEMPERATURE: 98.5 F | HEART RATE: 78 BPM | WEIGHT: 126 LBS | OXYGEN SATURATION: 99 % | DIASTOLIC BLOOD PRESSURE: 66 MMHG | SYSTOLIC BLOOD PRESSURE: 103 MMHG | HEIGHT: 58.5 IN

## 2024-08-30 DIAGNOSIS — N94.9 UNSPECIFIED CONDITION ASSOCIATED WITH FEMALE GENITAL ORGANS AND MENSTRUAL CYCLE: ICD-10-CM

## 2024-08-30 DIAGNOSIS — Z00.00 ENCOUNTER FOR GENERAL ADULT MEDICAL EXAMINATION WITHOUT ABNORMAL FINDINGS: ICD-10-CM

## 2024-08-30 DIAGNOSIS — Z71.1 PERSON WITH FEARED HEALTH COMPLAINT IN WHOM NO DIAGNOSIS IS MADE: ICD-10-CM

## 2024-08-30 DIAGNOSIS — N89.8 OTHER SPECIFIED NONINFLAMMATORY DISORDERS OF VAGINA: ICD-10-CM

## 2024-08-30 DIAGNOSIS — Z30.09 ENCOUNTER FOR OTHER GENERAL COUNSELING AND ADVICE ON CONTRACEPTION: ICD-10-CM

## 2024-08-30 LAB — HCG UR QL: NEGATIVE

## 2024-08-30 PROCEDURE — 36415 COLL VENOUS BLD VENIPUNCTURE: CPT

## 2024-08-30 PROCEDURE — 87801 DETECT AGNT MULT DNA AMPLI: CPT

## 2024-08-30 PROCEDURE — 99214 OFFICE O/P EST MOD 30 MIN: CPT | Mod: 25

## 2024-08-30 PROCEDURE — 87661 TRICHOMONAS VAGINALIS AMPLIF: CPT

## 2024-08-30 PROCEDURE — 87798 DETECT AGENT NOS DNA AMP: CPT

## 2024-08-30 PROCEDURE — 87389 HIV-1 AG W/HIV-1&-2 AB AG IA: CPT

## 2024-08-30 PROCEDURE — 87491 CHLMYD TRACH DNA AMP PROBE: CPT

## 2024-08-30 PROCEDURE — 81025 URINE PREGNANCY TEST: CPT

## 2024-08-30 PROCEDURE — 99204 OFFICE O/P NEW MOD 45 MIN: CPT

## 2024-08-30 PROCEDURE — 87591 N.GONORRHOEAE DNA AMP PROB: CPT

## 2024-08-30 PROCEDURE — 86593 SYPHILIS TEST NON-TREP QUANT: CPT

## 2024-09-06 LAB
A VAGINAE DNA VAG QL NAA+PROBE: NORMAL
APPEARANCE: CLEAR
BILIRUBIN URINE: NEGATIVE
BLOOD URINE: NEGATIVE
BVAB2 DNA VAG QL NAA+PROBE: NORMAL
C KRUSEI DNA VAG QL NAA+PROBE: NEGATIVE
C TRACH RRNA SPEC QL NAA+PROBE: NEGATIVE
C TRACH RRNA SPEC QL NAA+PROBE: NOT DETECTED
CANDIDA DNA VAG QL NAA+PROBE: NEGATIVE
COLOR: YELLOW
GLUCOSE QUALITATIVE U: NEGATIVE MG/DL
HIV1+2 AB SPEC QL IA.RAPID: NONREACTIVE
KETONES URINE: NEGATIVE MG/DL
LEUKOCYTE ESTERASE URINE: NEGATIVE
MEGA1 DNA VAG QL NAA+PROBE: NORMAL
N GONORRHOEA RRNA SPEC QL NAA+PROBE: NEGATIVE
N GONORRHOEA RRNA SPEC QL NAA+PROBE: NOT DETECTED
NITRITE URINE: NEGATIVE
PH URINE: 6.5
PROTEIN URINE: NEGATIVE MG/DL
RPR SER-TITR: NORMAL
SOURCE AMPLIFICATION: NORMAL
SPECIFIC GRAVITY URINE: 1.02
T VAGINALIS RRNA SPEC QL NAA+PROBE: NEGATIVE
UROBILINOGEN URINE: 0.2 MG/DL

## 2024-09-06 NOTE — PHYSICAL EXAM
[Awake] : awake [Alert] : alert [Cooperative] : was ~L cooperative [Soft] : soft [Acute Distress] : no acute distress [Tender] : non tender [Distended] : non distended

## 2024-09-06 NOTE — HISTORY OF PRESENT ILLNESS
[FreeTextEntry1] : Nexplanon placed in Jan. S/p tx for Syphilis, most recent RPR <1:1 Today concerned that she has not had period since Nexplanon implanted.  Has not had sexual activity. Concerned with vaginal discharge. Concerned with bumps on cheeks and wants derm referral.

## 2024-09-06 NOTE — ASSESSMENT
[FreeTextEntry1] : 22 yo F s/p syphilis treatment with Nexplanon placed 1/2024. Today reporting amenorrhea and vaginal discharge.  Plan: - uperg negative -GC/C, Vaginitis swab, HIV, syphilis RPR titer, UA with reflex culture -gyn referral given for Pap [facility] - RTC in 1-2 weeks for results  Patient confirmed understanding and agreement with plan

## 2024-09-12 DIAGNOSIS — N89.8 OTHER SPECIFIED NONINFLAMMATORY DISORDERS OF VAGINA: ICD-10-CM

## 2024-09-12 DIAGNOSIS — Z71.1 PERSON WITH FEARED HEALTH COMPLAINT IN WHOM NO DIAGNOSIS IS MADE: ICD-10-CM

## 2024-09-12 DIAGNOSIS — N94.9 UNSPECIFIED CONDITION ASSOCIATED WITH FEMALE GENITAL ORGANS AND MENSTRUAL CYCLE: ICD-10-CM

## 2024-09-12 DIAGNOSIS — Z70.9 SEX COUNSELING, UNSPECIFIED: ICD-10-CM

## 2024-09-12 DIAGNOSIS — Z30.09 ENCOUNTER FOR OTHER GENERAL COUNSELING AND ADVICE ON CONTRACEPTION: ICD-10-CM

## 2024-09-16 ENCOUNTER — APPOINTMENT (OUTPATIENT)
Dept: PEDIATRIC ADOLESCENT MEDICINE | Facility: CLINIC | Age: 24
End: 2024-09-16
Payer: COMMERCIAL

## 2024-09-16 ENCOUNTER — OUTPATIENT (OUTPATIENT)
Dept: OUTPATIENT SERVICES | Facility: HOSPITAL | Age: 24
LOS: 1 days | End: 2024-09-16
Payer: COMMERCIAL

## 2024-09-16 VITALS
WEIGHT: 125 LBS | SYSTOLIC BLOOD PRESSURE: 133 MMHG | HEIGHT: 58.66 IN | HEART RATE: 92 BPM | DIASTOLIC BLOOD PRESSURE: 80 MMHG | OXYGEN SATURATION: 100 % | TEMPERATURE: 98.1 F | BODY MASS INDEX: 25.54 KG/M2

## 2024-09-16 DIAGNOSIS — Z71.9 COUNSELING, UNSPECIFIED: ICD-10-CM

## 2024-09-16 DIAGNOSIS — Z00.00 ENCOUNTER FOR GENERAL ADULT MEDICAL EXAMINATION WITHOUT ABNORMAL FINDINGS: ICD-10-CM

## 2024-09-16 DIAGNOSIS — Z71.2 PERSON CONSULTING FOR EXPLANATION OF EXAMINATION OR TEST FINDINGS: ICD-10-CM

## 2024-09-16 PROCEDURE — T1013: CPT

## 2024-09-16 PROCEDURE — 99212 OFFICE O/P EST SF 10 MIN: CPT

## 2024-09-16 PROCEDURE — 99212 OFFICE O/P EST SF 10 MIN: CPT | Mod: 25

## 2024-09-17 DIAGNOSIS — Z71.2 PERSON CONSULTING FOR EXPLANATION OF EXAMINATION OR TEST FINDINGS: ICD-10-CM
